# Patient Record
Sex: MALE | Race: BLACK OR AFRICAN AMERICAN | NOT HISPANIC OR LATINO | Employment: STUDENT | URBAN - METROPOLITAN AREA
[De-identification: names, ages, dates, MRNs, and addresses within clinical notes are randomized per-mention and may not be internally consistent; named-entity substitution may affect disease eponyms.]

---
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
## 2021-12-30 ENCOUNTER — OFFICE VISIT (OUTPATIENT)
Dept: URGENT CARE | Facility: CLINIC | Age: 21
End: 2021-12-30
Payer: COMMERCIAL

## 2021-12-30 VITALS
HEART RATE: 68 BPM | RESPIRATION RATE: 18 BRPM | DIASTOLIC BLOOD PRESSURE: 70 MMHG | OXYGEN SATURATION: 98 % | WEIGHT: 160 LBS | SYSTOLIC BLOOD PRESSURE: 109 MMHG | HEIGHT: 72 IN | BODY MASS INDEX: 21.67 KG/M2 | TEMPERATURE: 98 F

## 2021-12-30 DIAGNOSIS — Z20.822 EXPOSURE TO CONFIRMED CASE OF COVID-19: ICD-10-CM

## 2021-12-30 DIAGNOSIS — Z11.59 ENCOUNTER FOR SCREENING FOR OTHER VIRAL DISEASES: ICD-10-CM

## 2021-12-30 DIAGNOSIS — B34.9 VIRAL ILLNESS: Primary | ICD-10-CM

## 2021-12-30 LAB
CTP QC/QA: YES
SARS-COV-2 RDRP RESP QL NAA+PROBE: NEGATIVE

## 2021-12-30 PROCEDURE — 1159F MED LIST DOCD IN RCRD: CPT | Mod: CPTII,S$GLB,, | Performed by: NURSE PRACTITIONER

## 2021-12-30 PROCEDURE — 3008F BODY MASS INDEX DOCD: CPT | Mod: CPTII,S$GLB,, | Performed by: NURSE PRACTITIONER

## 2021-12-30 PROCEDURE — 3074F PR MOST RECENT SYSTOLIC BLOOD PRESSURE < 130 MM HG: ICD-10-PCS | Mod: CPTII,S$GLB,, | Performed by: NURSE PRACTITIONER

## 2021-12-30 PROCEDURE — 99203 OFFICE O/P NEW LOW 30 MIN: CPT | Mod: S$GLB,,, | Performed by: NURSE PRACTITIONER

## 2021-12-30 PROCEDURE — 3078F DIAST BP <80 MM HG: CPT | Mod: CPTII,S$GLB,, | Performed by: NURSE PRACTITIONER

## 2021-12-30 PROCEDURE — U0002: ICD-10-PCS | Mod: QW,S$GLB,, | Performed by: NURSE PRACTITIONER

## 2021-12-30 PROCEDURE — 99203 PR OFFICE/OUTPT VISIT, NEW, LEVL III, 30-44 MIN: ICD-10-PCS | Mod: S$GLB,,, | Performed by: NURSE PRACTITIONER

## 2021-12-30 PROCEDURE — 1159F PR MEDICATION LIST DOCUMENTED IN MEDICAL RECORD: ICD-10-PCS | Mod: CPTII,S$GLB,, | Performed by: NURSE PRACTITIONER

## 2021-12-30 PROCEDURE — 3074F SYST BP LT 130 MM HG: CPT | Mod: CPTII,S$GLB,, | Performed by: NURSE PRACTITIONER

## 2021-12-30 PROCEDURE — 3078F PR MOST RECENT DIASTOLIC BLOOD PRESSURE < 80 MM HG: ICD-10-PCS | Mod: CPTII,S$GLB,, | Performed by: NURSE PRACTITIONER

## 2021-12-30 PROCEDURE — 3008F PR BODY MASS INDEX (BMI) DOCUMENTED: ICD-10-PCS | Mod: CPTII,S$GLB,, | Performed by: NURSE PRACTITIONER

## 2021-12-30 PROCEDURE — U0002 COVID-19 LAB TEST NON-CDC: HCPCS | Mod: QW,S$GLB,, | Performed by: NURSE PRACTITIONER

## 2024-07-25 ENCOUNTER — HOSPITAL ENCOUNTER (EMERGENCY)
Facility: HOSPITAL | Age: 24
Discharge: HOME | End: 2024-07-25
Attending: EMERGENCY MEDICINE

## 2024-07-25 VITALS
BODY MASS INDEX: 25.2 KG/M2 | OXYGEN SATURATION: 98 % | WEIGHT: 180 LBS | HEART RATE: 88 BPM | SYSTOLIC BLOOD PRESSURE: 138 MMHG | RESPIRATION RATE: 18 BRPM | HEIGHT: 71 IN | TEMPERATURE: 98 F | DIASTOLIC BLOOD PRESSURE: 81 MMHG

## 2024-07-25 DIAGNOSIS — H60.501 ACUTE OTITIS EXTERNA OF RIGHT EAR, UNSPECIFIED TYPE: Primary | ICD-10-CM

## 2024-07-25 PROCEDURE — 99283 EMERGENCY DEPT VISIT LOW MDM: CPT

## 2024-07-25 RX ORDER — NEOMYCIN SULFATE, POLYMYXIN B SULFATE AND HYDROCORTISONE 10; 3.5; 1 MG/ML; MG/ML; [USP'U]/ML
4 SUSPENSION/ DROPS AURICULAR (OTIC) 3 TIMES DAILY
Qty: 10 ML | Refills: 0 | Status: SHIPPED | OUTPATIENT
Start: 2024-07-25

## 2024-07-25 NOTE — ED NOTES
LOC: Patient verified via two identifiers. Patient awake, alert, oriented and speaking clearly and appropriately at this time.   APPEARANCE: Appears clean, well maintained, with clothing appropriate to environment.    NEURO: Eyes open spontaneously. Tolerating saliva secretions well. Able to follow commands, demonstrating ability to actively and appropriately communicate within context of current conversation. Symmetrical facial muscles.   AIRWAY: Bilateral chest rise and fall. RR regular and non-labored.  CIRCULATORY: Skin warm, dry, and color consistent with ethnicity. Capillary refill/skin blanching less than 3 seconds to distal of upper extremities.  ABDOMEN: Abdomen soft and non-distended.    MUSCULOSKELETAL: Moving all extremities well with no noted weakness. No redness, heat, swelling, deformity, or pain. Adequate muscle tone present. Movement is purposeful.  HEENT: Right ear pain and feeling of fullness/muffled hearing.

## 2024-07-25 NOTE — DISCHARGE INSTRUCTIONS
Thank you for allowing me and my emergency team to take care of you here today! I hope you feel better soon. Please do not hesitate to return with any additional concerns that may arise from this or any new problem you encounter.    Our goal in the emergency department is to always give you outstanding care and exceptional service. If you receive a survey by mail or e-mail in the next week regarding your experience in our ED, we would greatly appreciate you completing it. Your feedback provides us with a way to recognize our staff who give very good care and it helps us learn how to improve when your experience was below the excellence we aspire to be!    Brook Juneau, PA-C [facility] and [facility]   Emergency Room Physician Assistant

## 2024-07-26 NOTE — ED PROVIDER NOTES
"Encounter Date: 7/25/2024       History     Chief Complaint   Patient presents with    Ear Fullness     Pt was standing next to [employer] on Monday with back up alarm sounding. Feeling of ear fullness since that time.     Patient is a 23-year-old male with a past medical history of ADD who presents to emergency room for right ear fullness and intermittent ringing.  Patient states he was standing next to a [employer] on 3-4 days ago and was not wearing his ear plugs.  The machine was backing, sounding the alarm, and it was really close to his ear.  Since then he has had fullness, muffled hearing, and "feeling like my ear is swollen." No recent swimming in pools, lakes, Rivers.  However, patient states that he wears ear plugs for work and sweats often with them.  Does not clean them.  No treatment attempted prior to arrival.  Denies fever, body aches, chills, ear drainage, or facial swelling.    The history is provided by the patient. No  was used.     Review of patient's allergies indicates:  No Known Allergies  Past Medical History:   Diagnosis Date    ADD (attention deficit disorder)     on no medication     No past surgical history on file.  No family history on file.  Social History     Tobacco Use    Smoking status: Never    Smokeless tobacco: Never   Substance Use Topics    Alcohol use: Not Currently    Drug use: Never     Review of Systems   Constitutional:  Negative for chills, diaphoresis, fatigue and fever.   HENT:  Positive for ear pain (right ear). Negative for congestion, ear discharge, sinus pressure, sinus pain and sore throat.    Musculoskeletal:  Negative for arthralgias, joint swelling and myalgias.   Skin:  Negative for color change, rash and wound.   Neurological:  Negative for weakness and numbness.       Physical Exam     Initial Vitals [07/25/24 1737]   BP Pulse Resp Temp SpO2   138/81 88 18 98.1 °F (36.7 °C) 98 %      MAP       --         Physical Exam    Nursing note and " vitals reviewed.  Constitutional: He appears well-developed and well-nourished. He is not diaphoretic. No distress.   HENT:   Head: Normocephalic and atraumatic.   Right Ear: Hearing, tympanic membrane and external ear normal. There is tenderness.   Left Ear: Hearing, tympanic membrane, external ear and ear canal normal.   Patient with mild swelling, erythema, and tenderness of outer ear canal of right ear.   Eyes: Conjunctivae and EOM are normal.   Neck: Neck supple.   Normal range of motion.  Pulmonary/Chest: No respiratory distress.   Musculoskeletal:         General: No tenderness or edema. Normal range of motion.      Cervical back: Normal range of motion and neck supple.     Neurological: He is alert and oriented to person, place, and time. He has normal strength.   Skin: Skin is warm. Capillary refill takes less than 2 seconds.   Psychiatric: He has a normal mood and affect. His behavior is normal. Thought content normal.         ED Course   Procedures  Labs Reviewed - No data to display       Imaging Results    None          Medications - No data to display  Medical Decision Making  Patient presents to emergency room for ear fullness and muffled hearing.  Vital signs stable and within normal limits.  Physical exam as stated above.    Differential Diagnosis includes, but is not limited to malignant otitis externa, mastoiditis, cellulitis, abscess, TM rupture, foreign body, cerumen impaction, otitis media, or otitis externa.  No mastoid erythema or tenderness that would suggest mastoiditis.  This is not malignant otitis externa.  No evidence of cellulitis or abscess.  No foreign body noted. Tympanic membrane unremarkable. Outer ear canal on right side with erythema, swelling, and tenderness.  Clinical presentation and physical exam most suggestive of otitis externa.  Will prescribe patient Cortisporin ear drops.  Also advised on conservative management such as cleaning out his ear plugs.    I see no indication  of an emergent process beyond that addressed during our encounter. Patient stable for discharge at this time. I have counseled the patient regarding follow up with PCP and gave strict return precautions. I have discussed the final diagnosis and gave instructions regarding prescribed medications. Patient verbalized understanding and is agreeable.     Problems Addressed:  Acute otitis externa of right ear, unspecified type: acute illness or injury    Risk  Prescription drug management.  Risk Details: Comorbidities taken into consideration during the patient's evaluation and treatment include ADD.  Social determinants of health taken into consideration during development of our treatment plan include difficulty in obtaining follow-up, obtaining medications, health literacy, access to healthy options for preventative/conservative management, and/or support systems due to, but not limited to, transportation limitations, socioeconomic status, and environmental factors.                                       Clinical Impression:  Final diagnoses:  [H60.501] Acute otitis externa of right ear, unspecified type (Primary)          ED Disposition Condition    Discharge Stable          ED Prescriptions       Medication Sig Dispense Start Date End Date Auth. Provider    neomycin-polymyxin-hydrocortisone (CORTISPORIN) 3.5-10,000-1 mg/mL-unit/mL-% otic suspension Place 4 drops into the right ear 3 (three) times daily. 10 mL 7/25/2024 -- Brandy Burris PA-C          Follow-up Information       Follow up With Specialties Details Why Contact Info    Sarai Mccormack MD Pediatrics   [address] [place] [address] [id number] [phone]            This note was partially created using [employer] Voice Recognition software. Typographical and content errors may occur with this process. While efforts are made to detect and correct such errors, in some cases errors will persist. For this reason, wording in this document should be considered  in the proper context and not strictly verbatim.        Brandy Burris PA-C  07/26/24 0596

## 2024-10-16 ENCOUNTER — HOSPITAL ENCOUNTER (EMERGENCY)
Facility: HOSPITAL | Age: 24
Discharge: HOME | End: 2024-10-16
Attending: EMERGENCY MEDICINE

## 2024-10-16 VITALS
OXYGEN SATURATION: 100 % | HEIGHT: 72 IN | HEART RATE: 81 BPM | BODY MASS INDEX: 26.41 KG/M2 | DIASTOLIC BLOOD PRESSURE: 81 MMHG | WEIGHT: 195 LBS | TEMPERATURE: 98 F | SYSTOLIC BLOOD PRESSURE: 134 MMHG | RESPIRATION RATE: 16 BRPM

## 2024-10-16 DIAGNOSIS — H57.89 EYE IRRITATION: Primary | ICD-10-CM

## 2024-10-16 PROCEDURE — 25000003 PHARM REV CODE 250: Performed by: PHYSICIAN ASSISTANT

## 2024-10-16 PROCEDURE — 99283 EMERGENCY DEPT VISIT LOW MDM: CPT

## 2024-10-16 RX ORDER — NEOMYCIN AND POLYMYXIN B SULFATES AND BACITRACIN ZINC 400; 3.5; 1 [USP'U]/G; MG/G; [USP'U]/G
OINTMENT OPHTHALMIC 4 TIMES DAILY
Qty: 3.5 G | Refills: 0 | Status: SHIPPED | OUTPATIENT
Start: 2024-10-16

## 2024-10-16 RX ORDER — PROPARACAINE HYDROCHLORIDE 5 MG/ML
1 SOLUTION/ DROPS OPHTHALMIC
Status: COMPLETED | OUTPATIENT
Start: 2024-10-16 | End: 2024-10-16

## 2024-10-16 RX ADMIN — FLUORESCEIN SODIUM 1 EACH: 1 STRIP OPHTHALMIC at 12:10

## 2024-10-16 RX ADMIN — PROPARACAINE HYDROCHLORIDE 1 DROP: 5 SOLUTION/ DROPS OPHTHALMIC at 12:10

## 2024-10-16 NOTE — FIRST PROVIDER EVALUATION
Emergency Department TeleTriage Encounter Note      CHIEF COMPLAINT    Chief Complaint   Patient presents with    Eye Problem     C/o pain and blurry vision to R eye x3 days. Pt states squinting increases pain and blurry vision. Pt also reports headache x2 days. Using eye drops w/ sx relief. Denies other sx or complaints.        VITAL SIGNS   Initial Vitals [10/16/24 1120]   BP Pulse Resp Temp SpO2   134/81 81 16 98.1 °F (36.7 °C) 100 %      MAP       --            ALLERGIES    Review of patient's allergies indicates:  No Known Allergies    PROVIDER TRIAGE NOTE  Patient presents with pain and tearing to right eye. Feels like something got in it. Reports slightly blurred vision.       ORDERS  Labs Reviewed - No data to display    ED Orders (720h ago, onward)      None              Virtual Visit Note: The provider triage portion of this emergency department evaluation and documentation was performed via [employer], a HIPAA-compliant telemedicine application, in concert with a tele-presenter in the room. A face to face patient evaluation with one of my colleagues will occur once the patient is placed in an emergency department room.      DISCLAIMER: This note was prepared with [employer] voice recognition transcription software. Garbled syntax, mangled pronouns, and other bizarre constructions may be attributed to that software system.

## 2024-10-16 NOTE — DISCHARGE INSTRUCTIONS
Today you were seen in the emergency room for eye pain.  After examination, it appears this is likely due to chemical irritation.    I have prescribed you antibiotic eyedrops that your to use as prescribed to help prevent an infection.I recommend over the counter Tylenol (Acetaminophen) and/or Motrin (Ibuprofen) for additional control of pain. You can stagger the dosing so you are taking one or the other every three hours while spacing out the Tylenol and every 6 hours and the Motrin every 6 hours.    In addition, you may do warm compresses at home.  Do not put anything anterior eyes such as contacts during this time.  If you use contacts, police through these away and started new set after completing the course of antibiotics. DO NOT use an eye patch at home, as this may make it worse. It is common to have sensitivity to light during this time. Use sunglasses and avoid sunlight as much as you can.     Thank you for allowing me and my emergency team to take care of you here today! I hope you feel better soon. Please do not hesitate to return with any additional concerns that may arise from this or any new problem you encounter.    Our goal in the emergency department is to always give you outstanding care and exceptional service. If you receive a survey by mail or e-mail in the next week regarding your experience in our ED, we would greatly appreciate you completing it. Your feedback provides us with a way to recognize our staff who give very good care and it helps us learn how to improve when your experience was below the excellence we aspire to be!    Brook Juneau, PA-C [facility]   Emergency Room Physician Assistant      
Yes

## 2024-10-16 NOTE — ED NOTES
Pt presents to ED with C/O R eye irritation days that is causing him to have a HA. Pt states he have been taking generic OTC eye drops and Tylenol  mg. Last dose was last night. Pt states the pain is 7/10 at this time.

## 2024-10-16 NOTE — ED PROVIDER NOTES
"Encounter Date: 10/16/2024       History     Chief Complaint   Patient presents with    Eye Problem     C/o pain and blurry vision to R eye x3 days. Pt states squinting increases pain and blurry vision. Pt also reports headache x2 days. Using eye drops w/ sx relief. Denies other sx or complaints.      Patient is a 24-year-old male with a past medical history of ADD who presents to emergency room for right eye redness and irritation over the past 6-7 days.  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  He rinsed his eye out for 15 minutes afterwards.  Since then, he has been having intermittent blurry vision and irritation to eye that has been worsening.  "Feels as if something is inside of it." No drainage.  Does have intermittent headaches and lightheadedness with changing positions from sitting to standing.  He has been using over-the-counter eyedrops with some relief.     The history is provided by the patient. No  was used.     Review of patient's allergies indicates:  No Known Allergies  Past Medical History:   Diagnosis Date    ADD (attention deficit disorder)     on no medication     History reviewed. No pertinent surgical history.  No family history on file.  Social History     Tobacco Use    Smoking status: Never    Smokeless tobacco: Never   Substance Use Topics    Alcohol use: Not Currently    Drug use: Never     Review of Systems   Constitutional:  Negative for chills, diaphoresis, fatigue and fever.   Eyes:  Positive for pain, redness and visual disturbance (blurry). Negative for photophobia, discharge and itching.   Musculoskeletal:  Negative for arthralgias, joint swelling and myalgias.   Skin:  Negative for color change, rash and wound.   Neurological:  Negative for weakness and numbness.       Physical Exam     Initial Vitals [10/16/24 1120]   BP Pulse Resp Temp SpO2   134/81 81 16 98.1 °F (36.7 °C) 100 %      MAP       --     " [address]                      [facility], [address] [facility]                                  CONSULTATION    PATIENT NAME: Dutch Del Cid                 :        1934  MED REC NO:   [id number]                            ROOM:       W171  ACCOUNT NO:   [de-identified]                           ADMIT DATE: 2021  PROVIDER:     Katie Groves DO    CONSULT DATE:  2021    RENAL CONSULTATION    HISTORY OF PRESENT ILLNESS:  This is an 81year-old brought to the  hospital after having fallen at home. She has a history of known  coronary artery disease with PCI to LAD in  with known chronic  diastolic heart failure. The patient also has a history of known  hemicolectomy due to colon cancer. She has been having recent episode  of CHF approximately three weeks ago at [facility].  The patient was  brought to the hospital at this time after passing out at home. CT scan  of the head was negative. Telemetry did show evidence of asystole and  agonal beats between 50 and 60 per minute. The patient is being seen  due to azotemia. On admission to the hospital, the patient had a  creatinine of 1.6 and GFR of 25 mL/minute. At this time, the patient's  creatinine was 3.5 with a GFR of 12 mL/minute. Old records show on  10/09/2020, her creatinine was 1.8 and GFR of 27 mL/minute. The patient  is in distress with regards to having a brace up around her neck and  difficulty with its placement. The patient on admission was on Eliquis  due to atrial fibrillation. She also has been diabetic and is being  treated for that abnormality. PAST MEDICAL HISTORY:  Coronary artery disease, CKD IV, diabetes type 2,  systolic and diastolic heart failure, hyperlipidemia, overweight. FAMILY HISTORY:  Difficult to obtain at this time. HABITS:  Former smoker, stopped in . No alcohol use.     MEDICATIONS:  At the time of her admission; vitamin D, Zaroxolyn;     Physical Exam    Nursing note and vitals reviewed.  Constitutional: He appears well-developed and well-nourished. He is not diaphoretic. No distress.   HENT:   Head: Normocephalic and atraumatic.   Right Ear: External ear normal.   Left Ear: External ear normal.   Eyes: EOM are normal. Pupils are equal, round, and reactive to light. No foreign body present in the right eye. No foreign body present in the left eye. Right conjunctiva is injected. Left conjunctiva is not injected.   Slit lamp exam:       The right eye shows no corneal abrasion and no fluorescein uptake.        The left eye shows no corneal abrasion and no fluorescein uptake.   Mild conjunctiva injection.  Fluorescein staining without evidence of abrasion or uptake.  No periorbital cellulitis or swelling.   Neck: Neck supple.   Normal range of motion.  Pulmonary/Chest: No respiratory distress.   Musculoskeletal:         General: No tenderness or edema. Normal range of motion.      Cervical back: Normal range of motion and neck supple.     Neurological: He is alert and oriented to person, place, and time. He has normal strength.   Skin: Skin is warm. Capillary refill takes less than 2 seconds.   Psychiatric: He has a normal mood and affect. His behavior is normal. Thought content normal.         ED Course   Procedures  Labs Reviewed - No data to display       Imaging Results    None          Medications   proparacaine 0.5 % ophthalmic solution 1 drop (1 drop Right Eye Given 10/16/24 1203)   fluorescein ophthalmic strip 1 each (1 each Right Eye Given 10/16/24 1203)     Medical Decision Making  Patient presents to emergency room for eye and intermittent blurry vision.  Vital signs stable and within normal limits.  Physical exam as stated above.    Differential Diagnosis includes, but is not limited to acute glaucoma, open globe, ocular foreign body, retinal detachment, vitreous hemorrhage, endophthalmitis, traumatic injury, orbital fracture, corneal  abrasion/ulcer, retinal vascular occlusion, optic neuritis, periorbital/orbital cellulitis, hyphema, hypopion, iritis, UV keratitis, subconjunctival hemorrhage, conjunctivitis, blepharitis, chalazion/hordeolum, or benign vitreous floaters.  I do not suspect acute glaucoma or open globe.  Physical exam without foreign body.  No floaters or pain that would suggest retinal detachment or vitreous hemorrhage.  No history of orbital fracture.  Fluorescein staining without uptake.  No evidence of corneal abrasion. Patient does state that he has had contact with [employer] gas at work. Afterwards he rinsed his eye with water for 15 minutes. No evidence of severe corneal injury.  Will prescribe antibiotic eyedrops to use upon discharge to prevent infection.  Patient does not currently wear contacts.    I see no indication of an emergent process beyond that addressed during our encounter. Patient stable for discharge at this time. I have counseled the patient regarding follow up with Ophthalmology and gave strict return precautions. I have discussed the final diagnosis and gave instructions regarding prescribed medications. Patient verbalized understanding and is agreeable.     Problems Addressed:  Eye irritation: acute illness or injury    Amount and/or Complexity of Data Reviewed  External Data Reviewed: notes.     Details: Last seen by primary care in 2021 with outside facility.  Radiology:      Details: Considered ordering CT orbits.  However, patient without any periorbital cellulitis.  No pain with extraocular movements.  Low suspicion for orbital cellulitis.  Patient also has intermittent headache.  Neurologically intact.  Low suspicion for CVA/TIA or SAH.    Risk  OTC drugs.  Prescription drug management.  Risk Details: Comorbidities taken into consideration during the patient's evaluation and treatment include ADD.    Social determinants of health taken into consideration during development of our treatment plan include  difficulty in obtaining follow-up, obtaining medications, health literacy, access to healthy options for preventative/conservative management, and/or support systems due to, but not limited to, transportation limitations, socioeconomic status, and environmental factors.                                       Clinical Impression:  Final diagnoses:  [H57.89] Eye irritation (Primary)          ED Disposition Condition    Discharge Stable          ED Prescriptions       Medication Sig Dispense Start Date End Date Auth. Provider    naphazoline-pheniramine 0.025-0.3% (NAPHCON-A) 0.025-0.3 % ophthalmic solution Place 1 drop into the right eye 4 (four) times daily. for 14 days 15 mL 10/16/2024 10/30/2024 Brandy Burris PA-C    neomycin-bacitracin-polymyxin (POLYSPORIN) ophthalmic ointment Place into the right eye 4 (four) times daily. 3.5 g 10/16/2024 -- Brandy Burris PA-C          Follow-up Information       Follow up With Specialties Details Why Contact Info    Sarai Mccormack MD Pediatrics   [address] [place] [address]  [phone]            This note was partially created using [employer] Voice Recognition software. Typographical and content errors may occur with this process. While efforts are made to detect and correct such errors, in some cases errors will persist. For this reason, wording in this document should be considered in the proper context and not strictly verbatim.      Brandy Burris PA-C  10/16/24 1428

## 2024-10-16 NOTE — Clinical Note
"Jerman Cortésadis Herron was seen and treated in our emergency department on 10/16/2024.  He may return to work on 10/18/2024.       If you have any questions or concerns, please don't hesitate to call.      Brandy Burris PA-C"

## 2024-10-16 NOTE — ED TRIAGE NOTES
Right eye pain and irritation x 3 days with mild headache and blurred vision. No h/o injury. Denies drainage. Presents in no distress.

## 2024-10-16 NOTE — Clinical Note
"Jerman Hanson" Gopal was seen and treated in our emergency department on 10/16/2024.  He may return to work on 10/17/2024.       If you have any questions or concerns, please don't hesitate to call.       LPN    "

## 2024-12-09 ENCOUNTER — HOSPITAL ENCOUNTER (EMERGENCY)
Facility: HOSPITAL | Age: 24
Discharge: HOME | End: 2024-12-09
Attending: EMERGENCY MEDICINE

## 2024-12-09 VITALS
TEMPERATURE: 98 F | HEIGHT: 72 IN | RESPIRATION RATE: 18 BRPM | OXYGEN SATURATION: 99 % | SYSTOLIC BLOOD PRESSURE: 135 MMHG | HEART RATE: 81 BPM | WEIGHT: 190 LBS | BODY MASS INDEX: 25.73 KG/M2 | DIASTOLIC BLOOD PRESSURE: 86 MMHG

## 2024-12-09 DIAGNOSIS — Z20.2 POSSIBLE EXPOSURE TO STD: ICD-10-CM

## 2024-12-09 DIAGNOSIS — N48.1 BALANITIS: Primary | ICD-10-CM

## 2024-12-09 LAB
BILIRUB UR QL STRIP: NEGATIVE
CLARITY UR: CLEAR
COLOR UR: COLORLESS
GLUCOSE UR QL STRIP: NEGATIVE
HGB UR QL STRIP: NEGATIVE
KETONES UR QL STRIP: NEGATIVE
LEUKOCYTE ESTERASE UR QL STRIP: NEGATIVE
NITRITE UR QL STRIP: NEGATIVE
PH UR STRIP: 7 [PH] (ref 5–8)
PROT UR QL STRIP: NEGATIVE
SP GR UR STRIP: <1.005 (ref 1–1.03)
URN SPEC COLLECT METH UR: ABNORMAL
UROBILINOGEN UR STRIP-ACNC: NEGATIVE EU/DL

## 2024-12-09 PROCEDURE — 25000003 PHARM REV CODE 250

## 2024-12-09 PROCEDURE — 96372 THER/PROPH/DIAG INJ SC/IM: CPT

## 2024-12-09 PROCEDURE — 63600175 PHARM REV CODE 636 W HCPCS

## 2024-12-09 PROCEDURE — 99284 EMERGENCY DEPT VISIT MOD MDM: CPT | Mod: 25

## 2024-12-09 PROCEDURE — 87591 N.GONORRHOEAE DNA AMP PROB: CPT

## 2024-12-09 PROCEDURE — 81003 URINALYSIS AUTO W/O SCOPE: CPT | Performed by: PHYSICIAN ASSISTANT

## 2024-12-09 RX ORDER — LIDOCAINE HYDROCHLORIDE 10 MG/ML
5 INJECTION, SOLUTION EPIDURAL; INFILTRATION; INTRACAUDAL; PERINEURAL
Status: DISCONTINUED | OUTPATIENT
Start: 2024-12-09 | End: 2024-12-10 | Stop reason: HOSPADM

## 2024-12-09 RX ORDER — DOXYCYCLINE HYCLATE 100 MG
100 TABLET ORAL
Status: COMPLETED | OUTPATIENT
Start: 2024-12-09 | End: 2024-12-09

## 2024-12-09 RX ORDER — CLOTRIMAZOLE 1 %
CREAM (GRAM) TOPICAL 2 TIMES DAILY
Qty: 24 G | Refills: 0 | Status: SHIPPED | OUTPATIENT
Start: 2024-12-09

## 2024-12-09 RX ORDER — DOXYCYCLINE 100 MG/1
100 CAPSULE ORAL 2 TIMES DAILY
Qty: 20 CAPSULE | Refills: 0 | Status: SHIPPED | OUTPATIENT
Start: 2024-12-09 | End: 2024-12-09

## 2024-12-09 RX ORDER — MAG HYDROX/ALUMINUM HYD/SIMETH 200-200-20
SUSPENSION, ORAL (FINAL DOSE FORM) ORAL 2 TIMES DAILY
Qty: 56 G | Refills: 0 | Status: SHIPPED | OUTPATIENT
Start: 2024-12-09

## 2024-12-09 RX ORDER — CLOTRIMAZOLE 1 %
CREAM (GRAM) TOPICAL 2 TIMES DAILY
Qty: 24 G | Refills: 0 | Status: SHIPPED | OUTPATIENT
Start: 2024-12-09 | End: 2024-12-09

## 2024-12-09 RX ORDER — CEFTRIAXONE 500 MG/1
500 INJECTION, POWDER, FOR SOLUTION INTRAMUSCULAR; INTRAVENOUS
Status: COMPLETED | OUTPATIENT
Start: 2024-12-09 | End: 2024-12-09

## 2024-12-09 RX ORDER — MAG HYDROX/ALUMINUM HYD/SIMETH 200-200-20
SUSPENSION, ORAL (FINAL DOSE FORM) ORAL 2 TIMES DAILY
Qty: 56 G | Refills: 0 | Status: SHIPPED | OUTPATIENT
Start: 2024-12-09 | End: 2024-12-09

## 2024-12-09 RX ORDER — DOXYCYCLINE 100 MG/1
100 CAPSULE ORAL 2 TIMES DAILY
Qty: 20 CAPSULE | Refills: 0 | Status: SHIPPED | OUTPATIENT
Start: 2024-12-09 | End: 2024-12-19

## 2024-12-09 RX ADMIN — DOXYCYCLINE HYCLATE 100 MG: 100 TABLET, COATED ORAL at 11:12

## 2024-12-09 RX ADMIN — CEFTRIAXONE SODIUM 500 MG: 500 INJECTION, POWDER, FOR SOLUTION INTRAMUSCULAR; INTRAVENOUS at 11:12

## 2024-12-09 NOTE — Clinical Note
"Jerman Cortésadis Herron was seen and treated in our emergency department on 12/9/2024.  He may return to work on 12/11/2024.       If you have any questions or concerns, please don't hesitate to call.      Isaura Charlton MD"

## 2024-12-10 NOTE — FIRST PROVIDER EVALUATION
Emergency Department TeleTriage Encounter Note      CHIEF COMPLAINT    Chief Complaint   Patient presents with    Urinary Frequency     Patient states that he has burning to his penis when he urinates and urinary urgency for two days along with lower abd discomfort. He state that the distal portion of his penis is red, inflamed and itchy. He denies discharge, fever, hematuria.       VITAL SIGNS   Initial Vitals [12/09/24 2049]   BP Pulse Resp Temp SpO2   135/86 81 18 98.4 °F (36.9 °C) 99 %      MAP       --            ALLERGIES    Review of patient's allergies indicates:  No Known Allergies    PROVIDER TRIAGE NOTE  23 yo presents with burning with urination and discomfort to the tip of the penis.      ORDERS  Labs Reviewed - No data to display    ED Orders (720h ago, onward)      None              Virtual Visit Note: The provider triage portion of this emergency department evaluation and documentation was performed via [employer], a HIPAA-compliant telemedicine application, in concert with a tele-presenter in the room. A face to face patient evaluation with one of my colleagues will occur once the patient is placed in an emergency department room.      DISCLAIMER: This note was prepared with [employer] voice recognition transcription software. Garbled syntax, mangled pronouns, and other bizarre constructions may be attributed to that software system.

## 2024-12-10 NOTE — ED PROVIDER NOTES
Encounter Date: 12/9/2024       History     Chief Complaint   Patient presents with    Urinary Frequency     Patient states that he has burning to his penis when he urinates and urinary urgency for two days along with lower abd discomfort. He state that the distal portion of his penis is red, inflamed and itchy. He denies discharge, fever, hematuria.     Patient is a 24-year-old male with a past medical history of ADD who presents to emergency room for burning to tip of penis with urination, urinary urgency, suprapubic abdominal soreness, and itching good tip of his penis over the past few days, worsened in severity today.  Patient is concerned for STD.  Denies fever, body aches, or chills.  No penile discharge.  No medications taken prior to arrival.    The history is provided by the patient. No  was used.     Review of patient's allergies indicates:  No Known Allergies  Past Medical History:   Diagnosis Date    ADD (attention deficit disorder)     on no medication     No past surgical history on file.  No family history on file.  Social History     Tobacco Use    Smoking status: Never    Smokeless tobacco: Never   Substance Use Topics    Alcohol use: Not Currently    Drug use: Never     Review of Systems   Constitutional:  Negative for chills, diaphoresis, fatigue and fever.   Gastrointestinal:  Positive for abdominal pain (suprapubic soreness). Negative for constipation, diarrhea, nausea and vomiting.   Genitourinary:  Positive for dysuria, frequency, penile pain, penile swelling and urgency. Negative for genital sores, hematuria, penile discharge, scrotal swelling and testicular pain.   Musculoskeletal:  Negative for arthralgias, joint swelling and myalgias.   Skin:  Negative for color change, rash and wound.   Neurological:  Negative for weakness and numbness.       Physical Exam     Initial Vitals [12/09/24 2049]   BP Pulse Resp Temp SpO2   135/86 81 18 98.4 °F (36.9 °C) 99 %      MAP        --         Physical Exam    Nursing note and vitals reviewed.  Constitutional: He appears well-developed and well-nourished. He is not diaphoretic. No distress.   Patient well-appearing.  Awake and alert.  No acute distress.  Maintaining airway appropriately.  Speaking in complete sentences.   HENT:   Head: Normocephalic and atraumatic.   Right Ear: External ear normal.   Left Ear: External ear normal.   Eyes: Conjunctivae and EOM are normal.   Neck: Neck supple.   Normal range of motion.  Pulmonary/Chest: No respiratory distress.   Genitourinary:    Testes normal.   Circumcised. Penile erythema present. No phimosis or paraphimosis. No discharge found.    Genitourinary Comments:  exam done with nurse,Hao, in the room.         Musculoskeletal:         General: No tenderness or edema. Normal range of motion.      Cervical back: Normal range of motion and neck supple.     Neurological: He is alert and oriented to person, place, and time. He has normal strength.   Skin: Skin is warm. Capillary refill takes less than 2 seconds.   Psychiatric: He has a normal mood and affect. His behavior is normal. Thought content normal.         ED Course   Procedures  Labs Reviewed   URINALYSIS, REFLEX TO URINE CULTURE - Abnormal       Result Value    Specimen UA Urine, Clean Catch      Color, UA Colorless (*)     Appearance, UA Clear      pH, UA 7.0      Specific Gravity, UA <1.005 (*)     Protein, UA Negative      Glucose, UA Negative      Ketones, UA Negative      Bilirubin (UA) Negative      Occult Blood UA Negative      Nitrite, UA Negative      Urobilinogen, UA Negative      Leukocytes, UA Negative      Narrative:     Specimen Source->Urine   C. TRACHOMATIS/N. GONORRHOEAE BY AMP DNA          Imaging Results    None          Medications   cefTRIAXone injection 500 mg (500 mg Intramuscular Given 12/9/24 2311)   doxycycline tablet 100 mg (100 mg Oral Given 12/9/24 2309)     Medical Decision Making  Patient presents to  emergency room for dysuria, penile itching, urgency, and lower abdominal soreness.  Vital signs stable and within normal limits.  Physical exam as stated above.    Differential Diagnosis includes, but is not limited to STI, urethritis, testicular/appendix torsion, epididymitis, orchitis, UTI, kidney stone, urinary retention, appendicitis, prostatitis, testicular mass/neoplasm, or incarcerated/strangulated hernia. No testicular pain. No scrotal swelling. I do not suspect testicular torsion.  No right lower quadrant abdominal pain that would suggest appendicitis.  No hernia felt on exam.  Urinalysis unremarkable. No evidence of UTI.  Low suspicion for prostatitis.  Patient does have some erythema and swelling to glands of penis.  Most suggestive of balanitis.  Patient also concerned for STD at this time.  He was given Rocephin shot in the emergency room in addition to doxycycline.  Will prescribe doxycycline to use upon discharge for this.  We will also prescribe clotrimazole and hydrocortisone for balanitis.  Advised on safe sex practices and adequate hygiene measures.    I see no indication of an emergent process beyond that addressed during our encounter. Patient stable for discharge at this time. I have counseled the patient regarding follow up with PCP and gave strict return precautions. I have discussed the final diagnosis and gave instructions regarding prescribed medications. Patient verbalized understanding and is agreeable.       Problems Addressed:  Balanitis: acute illness or injury  Possible exposure to STD: acute illness or injury    Amount and/or Complexity of Data Reviewed  Labs: ordered. Decision-making details documented in ED Course.    Risk  Prescription drug management.  Risk Details: Comorbidities taken into consideration during the patient's evaluation and treatment include ADD.    Social determinants of health taken into consideration during development of our treatment plan include difficulty  in obtaining follow-up, obtaining medications, health literacy, access to healthy options for preventative/conservative management, and/or support systems due to, but not limited to, transportation limitations, socioeconomic status, and environmental factors.                ED Course as of 12/10/24 1442   Mon Dec 09, 2024   2221 Urinalysis, Reflex to Urine Culture Urine, Clean Catch(!)  Urinalysis without leukocytes or occult blood. [BJ]      ED Course User Index  [BJ] Brandy Burris PA-C                           Clinical Impression:  Final diagnoses:  [N48.1] Balanitis (Primary)  [Z20.2] Possible exposure to STD          ED Disposition Condition    Discharge Stable          ED Prescriptions       Medication Sig Dispense Start Date End Date Auth. Provider    doxycycline (VIBRAMYCIN) 100 MG Cap  (Status: Discontinued) Take 1 capsule (100 mg total) by mouth 2 (two) times daily. for 10 days 20 capsule 12/9/2024 12/9/2024 Brandy Burris PA-C    clotrimazole (LOTRIMIN) 1 % cream  (Status: Discontinued) Apply topically 2 (two) times daily. 24 g 12/9/2024 12/9/2024 Brandy Burris PA-C    hydrocortisone 1 % ointment  (Status: Discontinued) Apply topically 2 (two) times daily. 56 g 12/9/2024 12/9/2024 Brandy Burris PA-C    clotrimazole (LOTRIMIN) 1 % cream Apply topically 2 (two) times daily. 24 g 12/9/2024 -- Brandy Burris PA-C    doxycycline (VIBRAMYCIN) 100 MG Cap Take 1 capsule (100 mg total) by mouth 2 (two) times daily. for 10 days 20 capsule 12/9/2024 12/19/2024 Brandy Burris PA-C    hydrocortisone 1 % ointment Apply topically 2 (two) times daily. 56 g 12/9/2024 -- Brandy Burris PA-C          Follow-up Information       Follow up With Specialties Details Why Contact Info    Sarai Mccormack MD Pediatrics   [address] [facility]  [place] [address]  [phone]      [facility] Emergency Medicine Go to  If new or worsening symptoms occur [address] [place] [address]  [id number]  [phone]          This note was partially created using [email or web address] Voice Recognition software. Typographical and content errors may occur with this process. While efforts are made to detect and correct such errors, in some cases errors will persist. For this reason, wording in this document should be considered in the proper context and not strictly verbatim.        Brandy Burris PA-C  12/10/24 1443

## 2024-12-10 NOTE — DISCHARGE INSTRUCTIONS
Sexually transmitted infections (STIs) are infections spread by sexual contact. This includes genital skin-to-skin contact and vaginal, oral, and anal sex.  Today you were tested for gonorrhea and chlamydia.  This test takes several days to result, so be on the lookout for a call from [facility] to discuss your results.    You were given an antibiotic while in the emergency room by a shot, and prescribed a different antibiotic to take upon discharge.  Please take this for the full duration of 10 days in order to ensure resolution of infection.  While taking the medication, refrain from sexual activity, as you can still spread this infection while taking the antibiotic.  Inform all sexual partners if your results come back positive.  While taking this antibiotic, be sure to eat with taking the medication and do not drink alcohol.    In the future, in order to prevent STIs, use protection such as condoms during intercourse, stay up-to-date on all your vaccines, and get screened regularly if you are sexually active.    Thank you for allowing me and my emergency team to take care of you here today! I hope you feel better soon. Please do not hesitate to return with any additional concerns that may arise from this or any new problem you encounter.    Our goal in the emergency department is to always give you outstanding care and exceptional service. If you receive a survey by mail or e-mail in the next week regarding your experience in our ED, we would greatly appreciate you completing it. Your feedback provides us with a way to recognize our staff who give very good care and it helps us learn how to improve when your experience was below the excellence we aspire to be!    Brook Juneau, PA-C [facility]   Emergency Room Physician Assistant

## 2024-12-12 LAB
C TRACH DNA SPEC QL NAA+PROBE: NOT DETECTED
N GONORRHOEA DNA SPEC QL NAA+PROBE: NOT DETECTED

## 2025-03-14 ENCOUNTER — HOSPITAL ENCOUNTER (EMERGENCY)
Facility: HOSPITAL | Age: 25
Discharge: HOME | End: 2025-03-14
Attending: EMERGENCY MEDICINE

## 2025-03-14 VITALS
WEIGHT: 180 LBS | RESPIRATION RATE: 20 BRPM | TEMPERATURE: 98 F | BODY MASS INDEX: 24.38 KG/M2 | HEIGHT: 72 IN | OXYGEN SATURATION: 97 % | DIASTOLIC BLOOD PRESSURE: 75 MMHG | SYSTOLIC BLOOD PRESSURE: 130 MMHG | HEART RATE: 98 BPM

## 2025-03-14 DIAGNOSIS — L73.9 FOLLICULITIS: ICD-10-CM

## 2025-03-14 DIAGNOSIS — Z01.89 ENCOUNTER FOR BLOOD TEST: Primary | ICD-10-CM

## 2025-03-14 LAB — TREPONEMA PALLIDUM IGG+IGM AB [PRESENCE] IN SERUM OR PLASMA BY IMMUNOASSAY: NONREACTIVE

## 2025-03-14 PROCEDURE — 86593 SYPHILIS TEST NON-TREP QUANT: CPT | Performed by: NURSE PRACTITIONER

## 2025-03-14 PROCEDURE — 99281 EMR DPT VST MAYX REQ PHY/QHP: CPT

## 2025-03-14 NOTE — FIRST PROVIDER EVALUATION
Emergency Department TeleTriage Encounter Note      CHIEF COMPLAINT    Chief Complaint   Patient presents with    Rash     Patient reports rash on back x2 days, felt increased heat last night. He reports girlfriend had a positive RPR test yesterday. He denies any other symptoms at this time.        VITAL SIGNS   Initial Vitals [03/14/25 1327]   BP Pulse Resp Temp SpO2   130/75 98 20 97.9 °F (36.6 °C) 97 %      MAP       --            ALLERGIES    Review of patient's allergies indicates:  No Known Allergies    PROVIDER TRIAGE NOTE  This is a teletriage evaluation of a 24 y.o. male presenting to the ED complaining of rash on back for two days. States that his girlfriend recently had positive RPR. Reports that he was recently screened for STIs and was negative.    Alert, no distress.     Initial orders will be placed and care will be transferred to an alternate provider when patient is roomed for a full evaluation. Any additional orders and the final disposition will be determined by that provider.       ORDERS  Labs Reviewed   TREPONEMA PALLIDIUM ANTIBODIES IGG, IGM       ED Orders (720h ago, onward)      Start Ordered     Status Ordering Provider    03/14/25 1348 03/14/25 1347  Treponema Pallidium Antibodies IgG, IgM  Once         Ordered GUERO SANTOS              Virtual Visit Note: The provider triage portion of this emergency department evaluation and documentation was performed via [employer], a HIPAA-compliant telemedicine application, in concert with a tele-presenter in the room. A face to face patient evaluation with one of my colleagues will occur once the patient is placed in an emergency department room.      DISCLAIMER: This note was prepared with [employer] voice recognition transcription software. Garbled syntax, mangled pronouns, and other bizarre constructions may be attributed to that software system.

## 2025-03-14 NOTE — ED TRIAGE NOTES
"25 yo male reports to ed with co a rash to his upper back starting as red bumps and turning "black" onset 2 days ago. Denies  complaints, fever, or chills. States partner recently had a +RPR test.   "

## 2025-03-14 NOTE — DISCHARGE INSTRUCTIONS
Mr. Herron,     Thank you for letting me care for you today! It was nice meeting you, and I hope you feel better soon.   If you would like access to your chart and what was done today please utilize the [facility].   Please don't hesitate to return if your symptoms worsen or you develop any other worrisome symptoms.    Our goal in the emergency department is to always give you outstanding care and exceptional service. You may receive a survey by mail or e-mail in the next week regarding your experience in our ED. We would greatly appreciate you completing and returning the survey. Your feedback provides us with a way to recognize our staff who give very good care and it helps us learn how to improve when your experience was below our aspiration of excellence.     Sincerely,    Nancy Lindsey PA-C  Emergency Department Physician Assistant  [facility]

## 2025-03-15 NOTE — ED PROVIDER NOTES
Encounter Date: 3/14/2025       History     Chief Complaint   Patient presents with    Rash     Patient reports rash on back x2 days, felt increased heat last night. He reports girlfriend had a positive RPR test yesterday. He denies any other symptoms at this time.      24-year-old male with past medical history of ADD presents to the ED for blood work today.  States he has concerns for possible syphilis.  States that his girlfriend recently tested positive for RPR.  He denies any penile discharge.  No urinary frequency, hematuria, dysuria.  States he was recently tested negative for STIs.  He would like to get recheck for syphilis.  Also notes itchy rash on his back that worsens when he gets out from a hot shower. No new exposures, such as soap, lotions, detergents.  No treatments attempted prior to arrival.  No fever, chills, nausea, vomiting, chest pain, shortness of breath.  No other acute complaints today.        Review of patient's allergies indicates:  No Known Allergies  Past Medical History:   Diagnosis Date    ADD (attention deficit disorder)     on no medication     History reviewed. No pertinent surgical history.  No family history on file.  Social History[1]  Review of Systems   Constitutional:  Negative for chills and fever.   Respiratory:  Negative for shortness of breath.    Cardiovascular:  Negative for chest pain.   Gastrointestinal:  Negative for abdominal distention, abdominal pain, nausea and vomiting.   Musculoskeletal:  Negative for neck pain and neck stiffness.   Skin:  Positive for rash.       Physical Exam     Initial Vitals [03/14/25 1327]   BP Pulse Resp Temp SpO2   130/75 98 20 97.9 °F (36.6 °C) 97 %      MAP       --         Physical Exam    Vitals reviewed.  Constitutional: He appears well-developed and well-nourished. He is not diaphoretic. No distress.   HENT:   Head: Atraumatic.   Eyes: EOM are normal.   Neck: Neck supple.   Cardiovascular:  Normal rate and normal heart sounds.            Pulmonary/Chest: Breath sounds normal. No respiratory distress. He has no wheezes.   Abdominal: Abdomen is soft. He exhibits no distension. There is no abdominal tenderness.   Musculoskeletal:      Cervical back: Neck supple.     Neurological: He is alert and oriented to person, place, and time. GCS score is 15. GCS eye subscore is 4. GCS verbal subscore is 5. GCS motor subscore is 6.   Skin: Skin is warm. Capillary refill takes less than 2 seconds. No rash noted.   Small red papules noted to the upper back.  No tenderness to palpation.          ED Course   Procedures  Labs Reviewed   TREPONEMA PALLIDIUM ANTIBODIES IGG, IGM       Result Value    Treponema Pallidum Antibodies (IgG, IgM) Nonreactive            Imaging Results    None          Medications - No data to display  Medical Decision Making  Differential Diagnosis includes, but is not limited to:  Folliculitis, cellulitis, secondary syphilis, abscess, MRSA, drug eruption, allergic reaction/urticatia, irritant/contact dermatitis, viral exanthem, local trauma/contusion, abrasion.    ED management     24-year-old male with past medical history of ADD presents to the ED for blood work today.   Patient is not toxic appearing, hemodynamically stable and resting comfortably on bed. Patient is well-appearing.  Awake and alert.  Afebrile with vitals WNL. No distress on exam.  RPR results pending. Pt denies penile discharge or urinary complaints. Pt presents with rash limited to the back. Presentation consistent with folliculitis. No reported systemic symptoms. Advised supportive care.    I have discussed the specifics of the workup with the patient and the patient has verbalized understanding of the details of the workup, the diagnosis, the treatment plan, and the need for outpatient follow-up with PCP. ED precautions given. Discussed with pt about returning to the ED, if symptoms fail to improve or worsen.   RESULTS:  Documented in ED course.   Labs/ekg  interpreted by myself       Voice recognition software utilized in this note. Typographical and content errors may occur with this process. While efforts are made to detect and correct such errors, in some cases errors will persist. For this reason, wording in this document should be considered in the proper context and not strictly verbatim.                          ED Course as of 03/14/25 2331   Fri Mar 14, 2025   1512 BP: 130/75 [NW]   1512 Temp: 97.9 °F (36.6 °C) [NW]   1512 Pulse: 98 [NW]   1512 Resp: 20 [NW]   1512 SpO2: 97 % [NW]      ED Course User Index  [NW] Nancy Lindsey PA-C                           Clinical Impression:  Final diagnoses:  [Z01.89] Encounter for blood test (Primary)          ED Disposition Condition    Discharge Stable          ED Prescriptions    None       Follow-up Information       Follow up With Specialties Details Why Contact Info    Sarai Mccormack MD Pediatrics Schedule an appointment as soon as possible for a visit  As needed, If symptoms worsen [address] [facility] [address]  [phone]                 [1]   Social History  Tobacco Use    Smoking status: Never    Smokeless tobacco: Never   Substance Use Topics    Alcohol use: Not Currently    Drug use: Never        Nancy Lindsey PA-C  03/15/25 7936

## 2025-06-23 ENCOUNTER — HOSPITAL ENCOUNTER (EMERGENCY)
Facility: HOSPITAL | Age: 25
Discharge: HOME | End: 2025-06-23
Attending: EMERGENCY MEDICINE

## 2025-06-23 VITALS
WEIGHT: 185 LBS | SYSTOLIC BLOOD PRESSURE: 143 MMHG | HEART RATE: 86 BPM | BODY MASS INDEX: 25.06 KG/M2 | TEMPERATURE: 98 F | HEIGHT: 72 IN | OXYGEN SATURATION: 100 % | DIASTOLIC BLOOD PRESSURE: 87 MMHG | RESPIRATION RATE: 18 BRPM

## 2025-06-23 DIAGNOSIS — B35.6 TINEA CRURIS: Primary | ICD-10-CM

## 2025-06-23 LAB
BILIRUB UR QL STRIP.AUTO: NEGATIVE
CLARITY UR: CLEAR
COLOR UR AUTO: YELLOW
GLUCOSE UR QL STRIP: NEGATIVE
HGB UR QL STRIP: NEGATIVE
HOLD SPECIMEN: NORMAL
KETONES UR QL STRIP: NEGATIVE
LEUKOCYTE ESTERASE UR QL STRIP: NEGATIVE
NITRITE UR QL STRIP: NEGATIVE
PH UR STRIP: 7 [PH]
PROT UR QL STRIP: NEGATIVE
SP GR UR STRIP: 1.01
UROBILINOGEN UR STRIP-ACNC: NEGATIVE EU/DL

## 2025-06-23 PROCEDURE — 81003 URINALYSIS AUTO W/O SCOPE: CPT | Performed by: NURSE PRACTITIONER

## 2025-06-23 PROCEDURE — 99284 EMERGENCY DEPT VISIT MOD MDM: CPT

## 2025-06-23 PROCEDURE — 87491 CHLMYD TRACH DNA AMP PROBE: CPT | Performed by: NURSE PRACTITIONER

## 2025-06-23 RX ORDER — LULICONAZOLE 10 MG/G
CREAM TOPICAL
Qty: 60 G | Refills: 0 | Status: SHIPPED | OUTPATIENT
Start: 2025-06-23 | End: 2025-06-23

## 2025-06-23 RX ORDER — CLOTRIMAZOLE 1 %
CREAM (GRAM) TOPICAL
Qty: 45 G | Refills: 1 | Status: SHIPPED | OUTPATIENT
Start: 2025-06-23

## 2025-06-23 RX ORDER — LULICONAZOLE 10 MG/G
CREAM TOPICAL
Qty: 60 G | Refills: 0 | Status: SHIPPED | OUTPATIENT
Start: 2025-06-23

## 2025-06-24 LAB
C TRACH DNA SPEC QL NAA+PROBE: NOT DETECTED
CTGC SOURCE (OHS) ORD-325: NORMAL
N GONORRHOEA DNA UR QL NAA+PROBE: NOT DETECTED

## 2025-06-24 NOTE — ED PROVIDER NOTES
Encounter Date: 6/23/2025       History     Chief Complaint   Patient presents with    Exposure to STD     Patient states he has itching to his pubic area and anus for the last 2 weeks. He is requesting an STD check. Denies discharge, fever, urinary symptoms. He also reports feeling a lump in his throat when he swallows. Denies any pain     23 yo male with ADHD on no meds presents with groin and perianal itching for 2-3 weeks that started after using clippers. Denies rash, lice, lesions, ulcers, penile discharge, dysuria, or known STD exposures.       Review of patient's allergies indicates:  No Known Allergies  Past Medical History:   Diagnosis Date    ADD (attention deficit disorder)     on no medication     No past surgical history on file.  No family history on file.  Social History[1]  Review of Systems    Physical Exam     Initial Vitals [06/23/25 1937]   BP Pulse Resp Temp SpO2   (!) 143/87 86 18 98.4 °F (36.9 °C) 100 %      MAP       --         Physical Exam    Nursing note and vitals reviewed.  Constitutional: He appears well-developed and well-nourished.   HENT:   Head: Atraumatic.   Neck: No JVD present.   Cardiovascular:  Normal rate, regular rhythm and normal heart sounds.           Pulmonary/Chest: Breath sounds normal.   Genitourinary:    Penis normal.   No discharge found.   Musculoskeletal:         General: Normal range of motion.     Lymphadenopathy:     He has no cervical adenopathy.   Neurological: He is alert and oriented to person, place, and time.   Skin:   No LAD, well demarcated hyperpigmented scaling rash to groin and gluteal clefts consistent with tinea cruris. No blisters, ulcers or other abnormalities seen.          ED Course   Procedures  Labs Reviewed   URINALYSIS, REFLEX TO URINE CULTURE - Normal       Result Value    Color, UA Yellow      Appearance, UA Clear      pH, UA 7.0      Spec Grav UA 1.010      Protein, UA Negative      Glucose, UA Negative      Ketones, UA Negative       Bilirubin, UA Negative      Blood, UA Negative      Nitrites, UA Negative      Urobilinogen, UA Negative      Leukocyte Esterase, UA Negative     GREY TOP URINE HOLD    Extra Tube Hold for add-ons.     C. TRACHOMATIS/N. GONORRHOEAE BY AMP DNA          Imaging Results    None          Medications - No data to display  Medical Decision Making  Risk  Prescription drug management.                                      Clinical Impression:  Final diagnoses:  [B35.6] Tinea cruris (Primary)          ED Disposition Condition    Discharge Stable          ED Prescriptions       Medication Sig Dispense Start Date End Date Auth. Provider    luliconazole 1 % Crea  (Status: Discontinued) AAA QD for up to 4 weeks 60 g 6/23/2025 6/23/2025 Luis Ag MD    luliconazole 1 % Crea AAA QD for up to 4 weeks 60 g 6/23/2025 -- Luis Ag MD    clotrimazole (LOTRIMIN) 1 % cream Apply to affected area 2 times daily 45 g 6/23/2025 -- Luis Ag MD          Follow-up Information       Follow up With Specialties Details Why Contact Info    Sarai Mccormack MD Pediatrics Schedule an appointment as soon as possible for a visit   [address] [place] [address] [id number] [phone]                     [1]   Social History  Tobacco Use    Smoking status: Never    Smokeless tobacco: Never   Substance Use Topics    Alcohol use: Not Currently    Drug use: Never        Luis Ag MD  06/23/25 4053

## 2025-06-24 NOTE — ED TRIAGE NOTES
Presents awake, alert with c/o itching to genital area x 2 weeks. Denies discharge or urinary symptoms. Requesting STD check. No distress noted.

## 2025-06-24 NOTE — FIRST PROVIDER EVALUATION
Emergency Department TeleTriage Encounter Note      CHIEF COMPLAINT    Chief Complaint   Patient presents with    Exposure to STD     Patient states he has itching to his pubic area and anus for the last 2 weeks. He is requesting an STD check. Denies discharge, fever, urinary symptoms. He also reports feeling a lump in his throat when he swallows. Denies any pain       VITAL SIGNS   Initial Vitals [06/23/25 1937]   BP Pulse Resp Temp SpO2   (!) 143/87 86 18 98.4 °F (36.9 °C) 100 %      MAP       --            ALLERGIES    Review of patient's allergies indicates:  No Known Allergies    PROVIDER TRIAGE NOTE  Verbal consent for the teletriage evaluation was given by the patient at the start of the evaluation.  All efforts will be made to maintain patient's privacy during the evaluation.      This is a teletriage evaluation of a 24 y.o. male presenting to the ED with c/o itching to genital and anal area that started 2.5 weeks ago.  Also feels a lump in his throat.  Denies penile discharge, testicular pain/swelling. Limited physical exam via telehealth: The patient is awake, alert, answering questions appropriately and is not in respiratory distress.  As the Teletriage provider, I performed an initial assessment and ordered appropriate labs and imaging studies, if any, to facilitate the patient's care once placed in the ED. Once a room is available, care and a full evaluation will be completed by an alternate ED provider.  Any additional orders and the final disposition will be determined by that provider.  All imaging and labs will not be followed-up by the Teletriage Team, including myself.      ORDERS  Labs Reviewed - No data to display    ED Orders (720h ago, onward)      Start Ordered     Status Ordering Provider    Unscheduled 06/23/25 1958  Urinalysis, Reflex to Urine Culture Urine, Clean Catch  STAT         Ordered TEMITOPE BENITO    Unscheduled 06/23/25 1958  C. trachomatis/N. gonorrhoeae by AMP DNA [facility];  Urine  STAT         Ordered TEMITOPE BENITO A              Virtual Visit Note: The provider triage portion of this emergency department evaluation and documentation was performed via [employer], a HIPAA-compliant telemedicine application, in concert with a tele-presenter in the room. A face to face patient evaluation with one of my colleagues will occur once the patient is placed in an emergency department room.      DISCLAIMER: This note was prepared with [employer] voice recognition transcription software. Garbled syntax, mangled pronouns, and other bizarre constructions may be attributed to that software system.